# Patient Record
Sex: FEMALE
[De-identification: names, ages, dates, MRNs, and addresses within clinical notes are randomized per-mention and may not be internally consistent; named-entity substitution may affect disease eponyms.]

---

## 2021-03-11 PROBLEM — Z00.00 ENCOUNTER FOR PREVENTIVE HEALTH EXAMINATION: Status: ACTIVE | Noted: 2021-03-11

## 2021-03-12 ENCOUNTER — APPOINTMENT (OUTPATIENT)
Dept: PAIN MANAGEMENT | Facility: CLINIC | Age: 26
End: 2021-03-12
Payer: COMMERCIAL

## 2021-03-12 VITALS
DIASTOLIC BLOOD PRESSURE: 76 MMHG | HEART RATE: 80 BPM | SYSTOLIC BLOOD PRESSURE: 116 MMHG | BODY MASS INDEX: 36.63 KG/M2 | WEIGHT: 194 LBS | HEIGHT: 61 IN

## 2021-03-12 VITALS — TEMPERATURE: 98 F

## 2021-03-12 PROCEDURE — 99204 OFFICE O/P NEW MOD 45 MIN: CPT

## 2021-03-12 PROCEDURE — 99072 ADDL SUPL MATRL&STAF TM PHE: CPT

## 2021-03-25 NOTE — HISTORY OF PRESENT ILLNESS
[FreeTextEntry1] : december OOB \par qd lasts all day. no trggersno associated sxs. No triggers. No neck pain 2 days out of week neck pain may occr. Multiple neurologists. topamax, medrol dose ludmila pamelor  every night ? dose, for one week\par \par No stressors. Single No relationship. 2 children 5 and 3 \par One time had a sinus headaceh.\par wORK FOR [employer] ON THE JOB FOR 3 YEARS. nO CHANGE N DIET BUT GAINED 30 POUNDS . lp NEGATIVE. \par

## 2021-03-26 ENCOUNTER — APPOINTMENT (OUTPATIENT)
Dept: PAIN MANAGEMENT | Facility: CLINIC | Age: 26
End: 2021-03-26

## 2021-04-23 ENCOUNTER — APPOINTMENT (OUTPATIENT)
Dept: PAIN MANAGEMENT | Facility: CLINIC | Age: 26
End: 2021-04-23

## 2021-04-30 ENCOUNTER — APPOINTMENT (OUTPATIENT)
Dept: PAIN MANAGEMENT | Facility: CLINIC | Age: 26
End: 2021-04-30

## 2021-07-22 ENCOUNTER — APPOINTMENT (OUTPATIENT)
Dept: PAIN MANAGEMENT | Facility: CLINIC | Age: 26
End: 2021-07-22
Payer: COMMERCIAL

## 2021-07-22 VITALS
WEIGHT: 190 LBS | HEART RATE: 83 BPM | BODY MASS INDEX: 25.73 KG/M2 | DIASTOLIC BLOOD PRESSURE: 74 MMHG | SYSTOLIC BLOOD PRESSURE: 111 MMHG | HEIGHT: 72 IN

## 2021-07-22 DIAGNOSIS — R51.9 HEADACHE, UNSPECIFIED: ICD-10-CM

## 2021-07-22 DIAGNOSIS — G89.29 HEADACHE, UNSPECIFIED: ICD-10-CM

## 2021-07-22 PROCEDURE — 99212 OFFICE O/P EST SF 10 MIN: CPT

## 2021-07-22 NOTE — PHYSICAL EXAM
[General Appearance - Alert] : alert [General Appearance - Well-Appearing] : healthy appearing [Oriented To Time, Place, And Person] : oriented to person, place, and time [Affect] : the affect was normal [Mood] : the mood was normal [Cranial Nerves Facial (VII)] : face symmetrical [Cranial Nerves Accessory (XI - Cranial And Spinal)] : head turning and shoulder shrug symmetric [Cranial Nerves Hypoglossal (XII)] : there was no tongue deviation with protrusion [Motor Strength] : muscle strength was normal in all four extremities [Paresis Pronator Drift Right-Sided] : no pronator drift on the right [Paresis Pronator Drift Left-Sided] : no pronator drift on the left [Motor Strength Upper Extremities Bilaterally] : strength was normal in both upper extremities [Motor Strength Lower Extremities Bilaterally] : strength was normal in both lower extremities [Romberg's Sign] : Romberg's sign was negtive [Abnormal Walk] : normal gait [2+] : Brachioradialis left 2+ [Sclera] : the sclera and conjunctiva were normal [PERRL With Normal Accommodation] : pupils were equal in size, round, reactive to light, with normal accommodation [Extraocular Movements] : extraocular movements were intact [] : no respiratory distress [Edema] : there was no peripheral edema

## 2021-08-06 ENCOUNTER — OUTPATIENT (OUTPATIENT)
Dept: OUTPATIENT SERVICES | Facility: HOSPITAL | Age: 26
LOS: 1 days | End: 2021-08-06
Payer: COMMERCIAL

## 2021-08-06 ENCOUNTER — TRANSCRIPTION ENCOUNTER (OUTPATIENT)
Age: 26
End: 2021-08-06

## 2021-08-06 ENCOUNTER — APPOINTMENT (OUTPATIENT)
Dept: MRI IMAGING | Facility: CLINIC | Age: 26
End: 2021-08-06
Payer: COMMERCIAL

## 2021-08-06 DIAGNOSIS — R51.9 HEADACHE, UNSPECIFIED: ICD-10-CM

## 2021-08-06 PROCEDURE — 70551 MRI BRAIN STEM W/O DYE: CPT | Mod: 26

## 2021-08-06 PROCEDURE — 70551 MRI BRAIN STEM W/O DYE: CPT

## 2021-08-11 ENCOUNTER — APPOINTMENT (OUTPATIENT)
Dept: PAIN MANAGEMENT | Facility: CLINIC | Age: 26
End: 2021-08-11

## 2021-09-01 ENCOUNTER — NON-APPOINTMENT (OUTPATIENT)
Age: 26
End: 2021-09-01

## 2021-09-08 ENCOUNTER — APPOINTMENT (OUTPATIENT)
Dept: PAIN MANAGEMENT | Facility: CLINIC | Age: 26
End: 2021-09-08